# Patient Record
Sex: MALE | URBAN - METROPOLITAN AREA
[De-identification: names, ages, dates, MRNs, and addresses within clinical notes are randomized per-mention and may not be internally consistent; named-entity substitution may affect disease eponyms.]

---

## 2021-06-07 ENCOUNTER — NURSE TRIAGE (OUTPATIENT)
Dept: NURSING | Facility: CLINIC | Age: 36
End: 2021-06-07

## 2021-06-07 NOTE — TELEPHONE ENCOUNTER
Triage Call:    -Patient was playing softball 2 weeks ago.  -Patient states he was running and felt that he strained a muscle in his left calf/achilles region from running, which he normally doed not do baseline.   -Patient states he had pain for only 2 days, which has subsided.   -Patient denies any pain in calf, ankle, achilles region, shin, knee, thigh.  -Patient denies any swelling in the calf, ankle, achilles region, shin, knee, thigh.  -Patient denies any issues with walking or bearing weight on his left foot.   -Patient denies any redness in the calf, ankle, achilles, shin, knee or thigh  -Patient denies any chest pain or SOB.  -Patient denies any open cuts/wounds.   -Patient denies any circulation issues.   -Patient denies any fevers.    -Per protocol, recommendations are home care at this time. Home care eduction given. RN advised if patient develops any new or worsening sx to call back. Patient verbalized understanding and agrees with plan.     Noemi Alicea RN, BSN Nurse Triage Advisor 9:50 AM 6/7/2021     Reason for Disposition    Minor injury or pain from twisting or over-stretching    Minor injury or pain from direct blow    Leg pain from overuse (e.g., sports, running, physical work)    Caused by overuse from recent vigorous activity (e.g., aerobics, jogging/running, physical work, prolonged walking, sports)    Additional Information    Negative: Major bleeding (actively dripping or spurting) that can't be stopped    Negative: Bullet, stabbed by knife, or other serious penetrating wound    Negative: Looks like a dislocated joint (crooked or deformed)    Negative: Can't stand (bear weight) or walk    Negative: Serious injury with multiple fractures (broken bones)    Negative: Sounds like a life-threatening emergency to the triager    Negative: SEVERE pain (e.g. excruciating)    Negative: Wound looks infected    Negative: Leg pain not from an injury    Negative: Hip injury is main concern     Negative: Knee injury is main concern    Negative: Foot or ankle injury is main concern    Negative: Skin is split open or gaping (or length > 1/2 inch or 12 mm)    Negative: Bleeding won't stop after 10 minutes of direct pressure (using correct technique)    Negative: Dirt in the wound and not removed with 15 minutes of scrubbing    Negative: Sounds like a serious injury to the triager    Negative: Looks infected (e.g., spreading redness, pus, red streak)    Negative: No prior tetanus shots (or is not fully vaccinated) and any wound (e.g., cut or scrape)    Negative: HIV positive or severe immunodeficiency (severely weak immune system) and DIRTY cut or scrape    Negative: Patient wants to be seen    Negative: Injury interferes with work or school    Negative: High-risk adult (e.g., age > 60, osteoporosis, chronic steroid use) and limping    Negative: Large swelling or bruise and size > palm of person's hand    Negative: Suspicious history for the injury    Negative: Last tetanus shot > 5 years ago and DIRTY cut or scrape    Negative: Last tetanus shot >10 years ago and CLEAN cut or scrape    Negative: Injury and pain has not improved after 3 days    Negative: Injury is still painful or swollen after 2 weeks    Negative: Looks like a broken bone or dislocated joint (e.g., crooked or deformed)    Negative: Sounds like a life-threatening emergency to the triager    Negative: Chest pain    Negative: Difficulty breathing    Negative: Entire foot is cool or blue in comparison to other side    Negative: Unable to walk    Negative: Followed a hip injury    Negative: Followed a knee injury    Negative: Followed an ankle or foot injury    Negative: Back pain radiating (shooting) into leg(s)    Negative: Foot pain is the main symptom    Negative: Ankle pain is the main symptom    Negative: Knee pain is the main symptom    Negative: Leg swelling is the main symptom    Negative: Fever and red area (or area very tender to  touch)    Negative: Fever and swollen joint    Negative: Thigh or calf pain in only one leg and present > 1 hour    Negative: Thigh, calf, or ankle swelling in only one leg    Negative: Thigh, calf, or ankle swelling in both legs, but one side is definitely more swollen    Negative: History of prior 'blood clot' in leg or lungs (i.e., deep vein thrombosis, pulmonary embolism)    Negative: History of inherited increased risk of blood clots (e.g., factor 5 Leiden, antithrombin 3, protein C or protein S deficiency, prothrombin mutation)    Negative: Major surgery in the past month    Negative: Hip or leg fracture (broken bone) in past month (or had cast on leg or ankle in past month)    Negative: Illness requiring prolonged bedrest in past month (e.g., immobilization, long hospital stay)    Negative: Long-distance travel in past month (e.g., car, bus, train, plane; with trip lasting 6 or more hours)    Negative: Cancer treatment in the past two months (or has cancer now)    Negative: Patient sounds very sick or weak to the triager    Negative: SEVERE pain (e.g., excruciating, unable to do any normal activities)    Negative: Cast on leg or ankle and now has increasing pain    Negative: Red area or streak and large (> 2 in. or 5 cm)    Negative: Painful rash with multiple small blisters grouped together (i.e., dermatomal distribution or 'band' or 'stripe')    Negative: Looks like a boil, infected sore, deep ulcer, or other infected rash (spreading redness, pus)    Negative: Localized rash is very painful (no fever)    Negative: Numbness in a leg or foot (i.e., loss of sensation)    Negative: Localized pain, redness or hard lump along vein    Negative: Patient wants to be seen    Negative: MODERATE pain (e.g., interferes with normal activities, limping) and present > 3 days    Negative: Swollen joint with no fever or redness    Negative: Leg pain which occurs after walking a certain distance and disappears with rest, AND  age > 50    Negative: Leg pain in shins (front of lower legs) and it occurs with running or jumping exercise (e.g., jogging, basketball)    Negative: MILD pain persists > 7 days    Negative: Leg pain or muscle cramp is a chronic symptom (recurrent or ongoing AND lasting > 4 weeks)    Negative: Leg pain    Negative: Caused by strained muscle    Protocols used: LEG INJURY-A-OH, LEG PAIN-A-OH    COVID 19 Nurse Triage Plan/Patient Instructions    Please be aware that novel coronavirus (COVID-19) may be circulating in the community. If you develop symptoms such as fever, cough, or SOB or if you have concerns about the presence of another infection including coronavirus (COVID-19), please contact your health care provider or visit [email or web address].     Disposition/Instructions    Home care recommended. Follow home care protocol based instructions.    Thank you for taking steps to prevent the spread of this virus.  o Limit your contact with others.  o Wear a simple mask to cover your cough.  o Wash your hands well and often.    Resources    [facility]: About COVID-19: [email or web address]    CDC: What to Do If You're Sick: www.cdc.gov/coronavirus/2019-ncov/about/steps-when-sick.html    CDC: Ending Home Isolation: www.cdc.gov/coronavirus/2019-ncov/hcp/disposition-in-home-patients.html     CDC: Caring for Someone: www.cdc.gov/coronavirus/2019-ncov/if-you-are-sick/care-for-someone.html     [facility]: Interim Guidance for Hospital Discharge to Home: www.health[facility]/diseases/coronavirus/hcp/hospdischarge.pdf    [facility] clinical trials (COVID-19 research studies): [email or web address]     Below are the COVID-19 hotlines at the [place] [facility] ([facility]). Interpreters are available.   o For health questions: Call 784-004-7347 or 1-393.819.2312 (7 a.m. to 7 p.m.)  o For questions about schools and childcare: Call 890-659-5812 or 1-699.873.4055 (7 a.m.  to 7 p.m.)